# Patient Record
Sex: FEMALE | Race: WHITE | NOT HISPANIC OR LATINO | URBAN - METROPOLITAN AREA
[De-identification: names, ages, dates, MRNs, and addresses within clinical notes are randomized per-mention and may not be internally consistent; named-entity substitution may affect disease eponyms.]

---

## 2017-01-16 ENCOUNTER — OFFICE VISIT - RIVER FALLS (OUTPATIENT)
Dept: FAMILY MEDICINE | Facility: CLINIC | Age: 47
End: 2017-01-16

## 2017-01-16 ASSESSMENT — MIFFLIN-ST. JEOR: SCORE: 1364.88

## 2017-04-13 ENCOUNTER — OFFICE VISIT - RIVER FALLS (OUTPATIENT)
Dept: FAMILY MEDICINE | Facility: CLINIC | Age: 47
End: 2017-04-13

## 2017-05-30 ENCOUNTER — OFFICE VISIT - RIVER FALLS (OUTPATIENT)
Dept: FAMILY MEDICINE | Facility: CLINIC | Age: 47
End: 2017-05-30

## 2017-05-30 ENCOUNTER — COMMUNICATION - RIVER FALLS (OUTPATIENT)
Dept: FAMILY MEDICINE | Facility: CLINIC | Age: 47
End: 2017-05-30

## 2017-05-30 ASSESSMENT — MIFFLIN-ST. JEOR: SCORE: 1348.55

## 2017-07-13 ENCOUNTER — OFFICE VISIT - RIVER FALLS (OUTPATIENT)
Dept: FAMILY MEDICINE | Facility: CLINIC | Age: 47
End: 2017-07-13

## 2017-07-13 ASSESSMENT — MIFFLIN-ST. JEOR: SCORE: 1322.25

## 2017-08-14 ENCOUNTER — OFFICE VISIT - RIVER FALLS (OUTPATIENT)
Dept: FAMILY MEDICINE | Facility: CLINIC | Age: 47
End: 2017-08-14

## 2017-08-14 ASSESSMENT — MIFFLIN-ST. JEOR: SCORE: 1322.25

## 2017-10-12 ENCOUNTER — OFFICE VISIT - RIVER FALLS (OUTPATIENT)
Dept: FAMILY MEDICINE | Facility: CLINIC | Age: 47
End: 2017-10-12

## 2017-10-12 ASSESSMENT — MIFFLIN-ST. JEOR: SCORE: 1295.03

## 2017-10-31 ENCOUNTER — OFFICE VISIT - RIVER FALLS (OUTPATIENT)
Dept: FAMILY MEDICINE | Facility: CLINIC | Age: 47
End: 2017-10-31

## 2017-10-31 ASSESSMENT — MIFFLIN-ST. JEOR: SCORE: 1306.82

## 2018-01-11 ENCOUNTER — OFFICE VISIT - RIVER FALLS (OUTPATIENT)
Dept: FAMILY MEDICINE | Facility: CLINIC | Age: 48
End: 2018-01-11

## 2018-01-11 ASSESSMENT — MIFFLIN-ST. JEOR: SCORE: 1298.66

## 2018-05-10 ENCOUNTER — OFFICE VISIT - RIVER FALLS (OUTPATIENT)
Dept: FAMILY MEDICINE | Facility: CLINIC | Age: 48
End: 2018-05-10

## 2018-05-10 ASSESSMENT — MIFFLIN-ST. JEOR: SCORE: 1302.29

## 2018-08-15 ENCOUNTER — OFFICE VISIT - RIVER FALLS (OUTPATIENT)
Dept: FAMILY MEDICINE | Facility: CLINIC | Age: 48
End: 2018-08-15

## 2018-08-15 ASSESSMENT — MIFFLIN-ST. JEOR: SCORE: 1305.92

## 2018-11-08 ENCOUNTER — OFFICE VISIT - RIVER FALLS (OUTPATIENT)
Dept: FAMILY MEDICINE | Facility: CLINIC | Age: 48
End: 2018-11-08

## 2018-11-08 ASSESSMENT — MIFFLIN-ST. JEOR: SCORE: 1307.73

## 2019-02-11 ENCOUNTER — OFFICE VISIT - RIVER FALLS (OUTPATIENT)
Dept: FAMILY MEDICINE | Facility: CLINIC | Age: 49
End: 2019-02-11

## 2019-02-11 ASSESSMENT — MIFFLIN-ST. JEOR: SCORE: 1305.92

## 2019-05-09 ENCOUNTER — OFFICE VISIT - RIVER FALLS (OUTPATIENT)
Dept: FAMILY MEDICINE | Facility: CLINIC | Age: 49
End: 2019-05-09

## 2019-05-09 ASSESSMENT — MIFFLIN-ST. JEOR: SCORE: 1334.04

## 2019-08-13 ENCOUNTER — OFFICE VISIT - RIVER FALLS (OUTPATIENT)
Dept: FAMILY MEDICINE | Facility: CLINIC | Age: 49
End: 2019-08-13

## 2019-08-13 ASSESSMENT — MIFFLIN-ST. JEOR: SCORE: 1310.45

## 2022-02-11 VITALS
HEIGHT: 68 IN | DIASTOLIC BLOOD PRESSURE: 72 MMHG | WEIGHT: 154.4 LBS | BODY MASS INDEX: 23.4 KG/M2 | SYSTOLIC BLOOD PRESSURE: 128 MMHG | HEART RATE: 76 BPM

## 2022-02-11 VITALS
DIASTOLIC BLOOD PRESSURE: 78 MMHG | WEIGHT: 147.6 LBS | BODY MASS INDEX: 22.37 KG/M2 | TEMPERATURE: 98.8 F | HEIGHT: 68 IN | HEART RATE: 64 BPM | SYSTOLIC BLOOD PRESSURE: 124 MMHG

## 2022-02-12 VITALS
SYSTOLIC BLOOD PRESSURE: 120 MMHG | BODY MASS INDEX: 21.98 KG/M2 | TEMPERATURE: 97.1 F | HEIGHT: 68 IN | WEIGHT: 145 LBS | WEIGHT: 145 LBS | DIASTOLIC BLOOD PRESSURE: 62 MMHG | HEART RATE: 72 BPM | HEIGHT: 68 IN | BODY MASS INDEX: 21.98 KG/M2

## 2022-02-12 VITALS
BODY MASS INDEX: 21.07 KG/M2 | DIASTOLIC BLOOD PRESSURE: 66 MMHG | TEMPERATURE: 97.9 F | HEIGHT: 68 IN | HEIGHT: 68 IN | WEIGHT: 139 LBS | WEIGHT: 141.6 LBS | SYSTOLIC BLOOD PRESSURE: 110 MMHG | BODY MASS INDEX: 21.46 KG/M2 | TEMPERATURE: 97.4 F | OXYGEN SATURATION: 99 % | HEART RATE: 72 BPM

## 2022-02-12 VITALS
WEIGHT: 141.4 LBS | HEART RATE: 91 BPM | HEIGHT: 68 IN | SYSTOLIC BLOOD PRESSURE: 120 MMHG | DIASTOLIC BLOOD PRESSURE: 68 MMHG | BODY MASS INDEX: 21.43 KG/M2 | OXYGEN SATURATION: 99 %

## 2022-02-12 VITALS
OXYGEN SATURATION: 99 % | BODY MASS INDEX: 21.58 KG/M2 | HEART RATE: 67 BPM | SYSTOLIC BLOOD PRESSURE: 108 MMHG | WEIGHT: 142.4 LBS | HEIGHT: 68 IN | DIASTOLIC BLOOD PRESSURE: 70 MMHG

## 2022-02-12 VITALS
WEIGHT: 140.6 LBS | SYSTOLIC BLOOD PRESSURE: 108 MMHG | OXYGEN SATURATION: 99 % | HEART RATE: 98 BPM | BODY MASS INDEX: 21.31 KG/M2 | DIASTOLIC BLOOD PRESSURE: 74 MMHG | HEIGHT: 68 IN

## 2022-02-12 VITALS
OXYGEN SATURATION: 98 % | SYSTOLIC BLOOD PRESSURE: 122 MMHG | WEIGHT: 141.8 LBS | DIASTOLIC BLOOD PRESSURE: 76 MMHG | BODY MASS INDEX: 21.49 KG/M2 | HEART RATE: 95 BPM | HEIGHT: 68 IN

## 2022-02-12 VITALS
SYSTOLIC BLOOD PRESSURE: 118 MMHG | BODY MASS INDEX: 21.43 KG/M2 | WEIGHT: 141.4 LBS | HEIGHT: 68 IN | DIASTOLIC BLOOD PRESSURE: 76 MMHG | HEART RATE: 88 BPM | TEMPERATURE: 97.4 F

## 2022-02-12 VITALS
HEART RATE: 72 BPM | SYSTOLIC BLOOD PRESSURE: 122 MMHG | HEART RATE: 60 BPM | SYSTOLIC BLOOD PRESSURE: 110 MMHG | DIASTOLIC BLOOD PRESSURE: 70 MMHG | TEMPERATURE: 97.6 F | BODY MASS INDEX: 22.85 KG/M2 | WEIGHT: 150.2 LBS | BODY MASS INDEX: 23.01 KG/M2 | WEIGHT: 150.8 LBS | DIASTOLIC BLOOD PRESSURE: 66 MMHG | HEIGHT: 68 IN

## 2022-02-12 VITALS
BODY MASS INDEX: 21.19 KG/M2 | HEIGHT: 68 IN | HEART RATE: 92 BPM | DIASTOLIC BLOOD PRESSURE: 68 MMHG | WEIGHT: 139.8 LBS | OXYGEN SATURATION: 93 % | SYSTOLIC BLOOD PRESSURE: 102 MMHG

## 2022-02-16 NOTE — PROGRESS NOTES
Patient:   SHARON DOMINGUEZ            MRN: [id number]            FIN: [id number]               Age:   48 years     Sex:  Female     :  1970   Associated Diagnoses:   ADD (Attention Deficit Disorder)   Author:   Douglas Geller MD      Impression and Plan   Diagnosis     ADD (Attention Deficit Disorder) (MIE96-VA F90.0).     Course:  Progressing as expected.    Orders     Orders   Charges (Evaluation and Management):  96279 office outpatient visit 15 minutes (Charge) (Order): Quantity: 1, ADD (Attention Deficit Disorder).     Orders (Selected)   Prescriptions  Prescribed  Adderall 12.5 mg oral tablet: = 2 tab(s) ( 25 mg ), PO, BID, # 120 tab(s), 0 Refill(s), Type: Maintenance.     three prescriptions written.        Visit Information      Date of Service: 2019 08:40 am  Performing Location: [facility]  Encounter#: [id number]      Primary Care Provider (PCP):  Douglas Geller MD    NPI# [id number]      Referring Provider:  Douglas Geller MD, NPI# [id number]   Visit type:  General concerns, Scheduled follow-up.    Accompanied by:  No one.    Source of history:  Self.    Referral source:  Self.    History limitation:  None.       Chief Complaint   2019 8:46 AM CDT    Pt here for med ck        History of Present Illness             The patient presents with Here for ADD med refills.  Patient feels dose of Adderall is appropriate.  Medication continues to work well with no side effects.  Patient wants to continue same treatment plan..        Review of Systems   Constitutional:  Negative.    Eye:  Negative.    Ear/Nose/Mouth/Throat:  Negative.    Respiratory:  Negative.    Cardiovascular:  Negative.    Gastrointestinal:  Negative.    Genitourinary:  Negative.    Hematology/Lymphatics:  Negative.    Endocrine:  Negative.    Immunologic:  Negative.    Musculoskeletal:  Negative.    Integumentary:  Negative.    Neurologic:  Negative.    Psychiatric:  Negative.           All other systems  reviewed and negative      Health Status   Allergies:    Allergic Reactions (Selected)  Severity Not Documented  Pineapple (Throat/tongue swelling, tingling)   Medications:  (Selected)   Prescriptions  Prescribed  Adderall 12.5 mg oral tablet: = 2 tab(s) ( 25 mg ), PO, BID, # 120 tab(s), 0 Refill(s), Type: Maintenance  Documented Medications  Documented  Melatonin: hs, 0 Refill(s), Type: Maintenance  Vitamin D3 400 intl units oral capsule: = 1 cap(s) ( 400 International Unit ), Oral, daily, 0 Refill(s), Type: Maintenance  biotin: Oral, daily, 0 Refill(s), Type: Maintenance,    Medications          *denotes recorded medication          Adderall 12.5 mg oral tablet: 25 mg, 2 tab(s), PO, BID, 120 tab(s), 0 Refill(s).          *biotin: Oral, daily, 0 Refill(s).          *Vitamin D3 400 intl units oral capsule: 400 International Unit, 1 cap(s), Oral, daily, 0 Refill(s).          *Melatonin: hs, 0 Refill(s).       Problem list:    All Problems  ADD (Attention Deficit Disorder) / SNOMED CT 06895586 / Confirmed      Histories   Past Medical History:    No active or resolved past medical history items have been selected or recorded.   Family History:    Hypertension  Mother     Procedure history:    Dilation and curettage (SNOMED CT 07703016) in the month of 11/2007 at 36 Years.  cervical conization in the month of 9/2005 at 34 Years.  Tubal ligation (SNOMED CT 783223234) in the month of 9/2002 at 31 Years.  Comments:  5/11/2010 3:23 PM CDT - Sheryl Tapia  rt ooophorectomy due to benign teratoma  Tonsillectomy (SNOMED CT 452168646) in 1993 at 23 Years.  Hysterectomy (SNOMED CT 427112554).  Comments:  5/11/2010 3:24 PM CDT - Sheryl Tapia  supracervical due to fibroids   Social History:        Alcohol Assessment: Denies Alcohol Use      Tobacco Assessment: Denies Tobacco Use  ,        Alcohol Assessment: Denies Alcohol Use      Tobacco Assessment: Denies Tobacco Use        Physical Examination   Vital Signs   8/13/2019 8:46  AM CDT Peripheral Pulse Rate 67 bpm    Systolic Blood Pressure 108 mmHg    Diastolic Blood Pressure 70 mmHg    Mean Arterial Pressure 83 mmHg    Oxygen Saturation 99 %      Measurements from flowsheet : Measurements   8/13/2019 8:46 AM CDT Height Measured - Standard 67.75 in    Weight Measured - Standard 142.4 lb    BSA 1.76 m2    Body Mass Index 21.81 kg/m2      General:  Alert and oriented, No acute distress.    Eye:  Normal conjunctiva.    HENT:  Normocephalic.    Neck:  Supple, Non-tender.    Respiratory:  Lungs are clear to auscultation, Respirations are non-labored, Breath sounds are equal.    Cardiovascular:  Normal rate, Regular rhythm.    Integumentary:  Warm, Dry, Pink, No rash.    Neurologic:  Alert, Oriented, Normal motor function, No focal deficits.    Psychiatric:  Cooperative, Appropriate mood & affect, Normal judgment.       Review / Management   Results review

## 2022-02-16 NOTE — PROGRESS NOTES
Patient:   SHARON DOMINGUEZ            MRN: [id number]            FIN: [id number]               Age:   46 years     Sex:  Female     :  1970   Associated Diagnoses:   Changing skin lesion   Author:   Douglas Geller MD      Impression and Plan   Diagnosis     Changing skin lesion (CMR77-AA L98.9).     Orders      (Selected)   Outpatient Orders  Ordered (Dispatched)  Tissue pathology* ([facility]): Specimen Type: Miscellaneous Specimen, Collection Date: 17 9:53:00 CDT  Order  [id number] shaving skin lesion 1 s/n/h/f/g diam 0.6-1.0 cm (Charge): Quantity: 1, Changing skin lesion.     Counseled:  Patient, Regarding diagnosis, Regarding treatment.       Visit Information      Date of Service: 2017 09:15 am  Performing Location: [facility]  Encounter#: [id number]      Primary Care Provider (PCP):  Douglas Geller MD    NPI# [id number]   Visit type:  New symptom.    Accompanied by:  No one.    Source of history:  Self.    History limitation:  None.       Procedure   Biopsy procedure   Date/ Time:  2017 9:56:00 AM.     Confirmed: patient, procedure, side, site, safety procedures followed.     Performed by: Douglas Geller MD.     Informed consent: signed by patient.     Indication: abnormal physical findings, suspected malignancy.     Preparation and technique: skin prepped (in usual sterile fashion, with alcohol), sterile preparation of site, local anesthesia 1% lidocaine without epinephrine 1.0  ml, technique (shave biopsy, number 10 scalpel), hemostasis achieved using electrocautery.     Site #  1: right, face, size and depth (length  1.0  cm, width  0.5  cm, superficial), specimen sent to pathology in preservative.     Completion: 1  lesions biopsied, estimated blood loss minimal, dressing applied (adhesive strip, with antibiotic ointment).     Procedure tolerated: well.     wound care instructions given.

## 2022-02-16 NOTE — TELEPHONE ENCOUNTER
---------------------  From: Radha Oliver CMA   To: Douglas Geller MD;     Sent: 2019 4:18:21 PM CDT  Subject: refill request- adderall     PCP:   JOSE    Medication:   adderall  Last Filled:  19 by [facility] x 2 rx's pt to call for the 3rd   Quantity:  _  Refills:  _      Date of last office visit and reason:   19      Note:  Pt called requesting refill    Pharmacy: [facility]    Resource:   [facility]  Phone:   [phone]  ** Submitted: **  Order:amphetamine-dextroamphetamine (Adderall 12.5 mg oral tablet)  2 tab(s)  PO  BID  Qty:  120 tab(s)        Refills:  0          Substitutions Allowed     Route To Pharmacy - [facility]    Signed by Douglas Geller MD  4/10/2019 12:13:00 PM

## 2022-02-16 NOTE — PROGRESS NOTES
Patient:   SHARON DOMINGUEZ            MRN: [id number]            FIN: [id number]               Age:   46 years     Sex:  Female     :  1970   Associated Diagnoses:   Adult ADHD   Author:   Douglas Mathews PA-C      Chief Complaint   10/12/2017 11:09 AM CDT  Pt here for ADHD med ck        History of Present Illness             The patient presents with Presents for follow up of attention deficit disorder.  There have been no problems with the medication.  There are no other current concerns..                Review of Systems   Constitutional:  Negative.    Cardiovascular:  Negative.    Gastrointestinal:  Negative.    Musculoskeletal:  Negative.    Neurologic:  Negative.    Psychiatric:  PHQ-9=  0, No anxiety, No depression.       Health Status   Allergies:    Allergic Reactions (Selected)  Severity Not Documented  Pineapple (Throat/tongue swelling, tingling)   Problem list:    All Problems  ADD (Attention Deficit Disorder) / SNOMED CT 12157939 / Confirmed   Medications:  (Selected)   Prescriptions  Prescribed  Adderall XR 25 mg oral capsule, extended release: 2 cap(s) ( 50 mg ), PO, qAM, # 60 cap(s), 0 Refill(s), Type: Maintenance, Pharmacy: [facility], 2 cap(s) po qam  Documented Medications  Documented  Fish Oil oral capsule: po, daily, 0 Refill(s), Type: Maintenance  Iron Chews: See Instructions, Instructions: 18mg po every other day, 0 Refill(s), Type: Maintenance  Melatonin: hs, 0 Refill(s), Type: Maintenance  Vitamin D3 2000 intl units oral tablet: 1 tab(s) ( 2,000 International Unit ), po, daily, 0 Refill(s), Type: Maintenance  barkberry: barkberry, See Instructions, Supply, 0 Refill(s), Type: Maintenance  coconut oil vitamin: coconut oil vitamin, Supply, 0 Refill(s), Type: Maintenance  multivitamin with minerals (w/ Iron): 0 Refill(s)      Histories   Past Medical History:    No active or resolved past medical history items have been selected or recorded.   Family History:     Hypertension  Mother     Procedure history:    Dilation and curettage (64360646) in the month of 11/2007 at 36 Years.  cervical conization in the month of 9/2005 at 34 Years.  Tubal ligation (258249086) in the month of 9/2002 at 31 Years.  Comments:  5/11/2010 3:23 PM - Sheryl Tapia  rt ooophorectomy due to benign teratoma  Tonsillectomy (214087122) in 1993 at 23 Years.  Hysterectomy (279814537).  Comments:  5/11/2010 3:24 PM - Sheryl Tapia  supracervical due to fibroids   Social History:        Alcohol Assessment: Denies Alcohol Use      Tobacco Assessment: Denies Tobacco Use        Physical Examination   Vital Signs   10/12/2017 11:09 AM CDT Temperature Tympanic 97.4 DegF  LOW    Peripheral Pulse Rate 72 bpm    Pulse Site Radial artery    Systolic Blood Pressure 110 mmHg    Diastolic Blood Pressure 66 mmHg    Mean Arterial Pressure 81 mmHg    BP Site Left arm    Oxygen Saturation 99 %      Measurements from flowsheet : Measurements   10/12/2017 11:09 AM CDT Height Measured - Standard 67.75 in    Weight Measured - Standard 139 lb    BSA 1.73 m2    Body Mass Index 21.29 kg/m2      General:  No acute distress.    HENT:  Tympanic membranes are clear, No pharyngeal erythema, No sinus tenderness.    Neck:  Supple, Non-tender, No lymphadenopathy.    Respiratory:  Lungs are clear to auscultation.    Cardiovascular:  Normal rate, Regular rhythm, No murmur.    Psychiatric:  Cooperative, Appropriate mood & affect.       Impression and Plan   Diagnosis     Adult ADHD (VWX59-TI F90.0).     Course:  Progressing as expected, Well controlled.    Plan:  continue current medication, follow up in 90 days.    Orders     Orders   Pharmacy:  Adderall XR 25 mg oral capsule, extended release (Prescribe): 2 cap(s) ( 50 mg ), PO, qAM, Instructions: fill on or after 10/12/2017, # 60 cap(s), 0 Refill(s), Type: Maintenance, Pharmacy: [facility], 2 cap(s) po qam,Instr:fill on or after 10/12/2017  Adderall XR 25 mg oral capsule,  extended release (Modify): 2 cap(s) ( 50 mg ), PO, qAM, # 60 cap(s), 0 Refill(s), Type: Hard Stop, Pharmacy: [facility].     Orders   Charges (Evaluation and Management):  26879 office outpatient visit 15 minutes (Charge) (Order): Quantity: 1, Adult ADHD.     Summary

## 2022-02-16 NOTE — PROGRESS NOTES
Patient:   SHARON DOMINGUEZ            MRN: [id number]            FIN: [id number]               Age:   46 years     Sex:  Female     :  1970   Associated Diagnoses:   None   Author:   Douglas Geller MD      The [facility] website record of scheduled medication dispensations for this patient was reviewed prior to providing a new prescription.

## 2022-02-16 NOTE — NURSING NOTE
Comprehensive Intake Entered On:  8/13/2019 8:50 AM CDT    Performed On:  8/13/2019 8:46 AM CDT by Radha Oliver CMA               Summary   Chief Complaint :   Pt here for med ck   Weight Measured :   142.4 lb(Converted to: 142 lb 6 oz, 64.59 kg)    Height Measured :   67.75 in(Converted to: 5 ft 8 in, 172.08 cm)    Body Mass Index :   21.81 kg/m2   Body Surface Area :   1.76 m2   Systolic Blood Pressure :   108 mmHg   Diastolic Blood Pressure :   70 mmHg   Mean Arterial Pressure :   83 mmHg   Peripheral Pulse Rate :   67 bpm   Oxygen Saturation :   99 %   Radha Oliver CMA - 8/13/2019 8:46 AM CDT   Health Status   Allergies Verified? :   Yes   Medication History Verified? :   Yes   Medical History Verified? :   Yes   Pre-Visit Planning Status :   Completed   Tobacco Use? :   Never smoker   Radha Oliver CMA - 8/13/2019 8:46 AM CDT   Consents   Consent for Immunization Exchange :   Consent Granted   Consent for Immunizations to Providers :   Consent Granted   Radha Oliver CMA - 8/13/2019 8:46 AM CDT   Meds / Allergies   (As Of: 8/13/2019 8:50:15 AM CDT)   Allergies (Active)   Pineapple  Estimated Onset Date:   Unspecified ; Reactions:   throat/tongue swelling, tingling ; Created By:   Julienne Gallardo CMA; Reaction Status:   Active ; Category:   Food ; Substance:   Pineapple ; Type:   Allergy ; Updated By:   Julienne Gallardo CMA; Source:   Patient ; Reviewed Date:   8/13/2019 8:47 AM CDT        Medication List   (As Of: 8/13/2019 8:50:15 AM CDT)   Prescription/Discharge Order    amphetamine-dextroamphetamine  :   amphetamine-dextroamphetamine ; Status:   Prescribed ; Ordered As Mnemonic:   Adderall 12.5 mg oral tablet ; Simple Display Line:   25 mg, 2 tab(s), PO, BID, 120 tab(s), 0 Refill(s) ; Ordering Provider:   Douglas Geller MD; Catalog Code:   amphetamine-dextroamphetamine ; Order Dt/Tm:   5/9/2019 4:52:04 PM            Home Meds    biotin  :   biotin ; Status:   Documented ; Ordered As  Mnemonic:   biotin ; Simple Display Line:   Oral, daily, 0 Refill(s) ; Catalog Code:   biotin ; Order Dt/Tm:   2/11/2019 9:04:33 AM          cholecalciferol  :   cholecalciferol ; Status:   Documented ; Ordered As Mnemonic:   Vitamin D3 400 intl units oral capsule ; Simple Display Line:   400 International Unit, 1 cap(s), Oral, daily, 0 Refill(s) ; Catalog Code:   cholecalciferol ; Order Dt/Tm:   5/9/2019 4:35:01 PM          melatonin  :   melatonin ; Status:   Documented ; Ordered As Mnemonic:   Melatonin ; Simple Display Line:   hs, 0 Refill(s) ; Catalog Code:   melatonin ; Order Dt/Tm:   4/13/2017 8:58:26 AM

## 2022-02-16 NOTE — PROGRESS NOTES
Patient:   SHARON DOMINGUEZ            MRN: [id number]            FIN: [id number]               Age:   48 years     Sex:  Female     :  1970   Associated Diagnoses:   ADD (attention deficit disorder) without hyperactivity   Author:   Aranza Herrera      Visit Information      Date of Service: 2019 08:51 am  Performing Location: [facility]  Encounter#: [id number]      Primary Care Provider (PCP):  Douglas Geller MD    NPI# [id number]      Referring Provider:  Aranza Herrera    NPI# [id number]   Visit type:  General concerns.       Chief Complaint   2019 8:59 AM CST    Pt here for med check/refill- Adderall        History of Present Illness             The patient presents with here for ADD/ADHD med refill.  Denies sleep problems or tics  No racing heart  Weight loss is not a concern  Able to focus on work    her CSA and urine drug tests are UTD.        Review of Systems             Health Status   Allergies:    Allergic Reactions (Selected)  Severity Not Documented  Pineapple (Throat/tongue swelling, tingling)   Medications:  (Selected)   Prescriptions  Prescribed  Adderall 12.5 mg oral tablet: = 2 tab(s) ( 25 mg ), PO, BID, # 120 tab(s), 0 Refill(s), Type: Maintenance, Pharmacy: [facility], 2 tab(s) Oral bid  Adderall 12.5 mg oral tablet: = 2 tab(s) ( 25 mg ), PO, BID, Instructions: fill on or after 2018, # 120 tab(s), 0 Refill(s), Type: Maintenance, Pharmacy: [facility], 2 tab(s) Oral bid,Instr:fill on or after 2018  Adderall 12.5 mg oral tablet: = 2 tab(s) ( 25 mg ), PO, BID, Instructions: ok to fill in 30 days, # 120 tab(s), 0 Refill(s), Type: Maintenance, Pharmacy: [facility], 2 tab(s) Oral bid,Instr:ok to fill in 30 days  Documented Medications  Documented  Melatonin: hs, 0 Refill(s), Type: Maintenance  biotin: Oral, daily, 0 Refill(s), Type: Maintenance   Problem list:    All Problems  ADD (Attention Deficit Disorder) / SNOMED CT  75244538 / Confirmed      Histories   Past Medical History:    No active or resolved past medical history items have been selected or recorded.   Family History:    Hypertension  Mother     Procedure history:    Dilation and curettage (SNOMED CT 70101942) in the month of 11/2007 at 36 Years.  cervical conization in the month of 9/2005 at 34 Years.  Tubal ligation (SNOMED CT 052906229) in the month of 9/2002 at 31 Years.  Comments:  5/11/2010 3:23 PM CDT - Sheryl Tapia  rt ooophorectomy due to benign teratoma  Tonsillectomy (SNOMED CT 423954049) in 1993 at 23 Years.  Hysterectomy (SNOMED CT 941349834).  Comments:  5/11/2010 3:24 PM CDT - Sheryl Tapia  supracervical due to fibroids   Social History:        Alcohol Assessment: Denies Alcohol Use      Tobacco Assessment: Denies Tobacco Use,        Alcohol Assessment: Denies Alcohol Use      Tobacco Assessment: Denies Tobacco Use      Physical Examination   Vital Signs   2/11/2019 8:59 AM CST Temperature Tympanic 97.4 DegF  LOW    Peripheral Pulse Rate 88 bpm    Pulse Site Radial artery    HR Method Manual    Systolic Blood Pressure 118 mmHg    Diastolic Blood Pressure 76 mmHg    Mean Arterial Pressure 90 mmHg    BP Site Right arm    BP Method Manual      Measurements from flowsheet : Measurements   2/11/2019 8:59 AM CST Height Measured - Standard 67.75 in    Weight Measured - Standard 141.4 lb    BSA 1.75 m2    Body Mass Index 21.66 kg/m2      General:  Alert and oriented, No acute distress.    Eye:  Normal conjunctiva.    HENT:  Normocephalic.    Neck:  Supple, Non-tender.    Respiratory:  Lungs are clear to auscultation, Respirations are non-labored, Breath sounds are equal.    Cardiovascular:  Normal rate, Regular rhythm.    Integumentary:  Warm, Dry, Pink, No rash.    Neurologic:  Alert, Oriented, Normal motor function, No focal deficits.    Psychiatric:  Cooperative, Appropriate mood & affect, Normal judgment.       Review / Management   Results review       Impression and Plan   Diagnosis     ADD (attention deficit disorder) without hyperactivity (GRQ08-NI F90.0).     Course:  Progressing as expected.    Patient Instructions:       Counseled: Patient, Regarding diagnosis, Regarding treatment, Regarding medications, Verbalized understanding.    Orders     15 min wtih pt, over 10 min in counseling  [place] attempted [place] check [place] and she was not listed on [place] or [place] database  I called her pharmacy  and pharmacisit Asim confirms that  she picked up her last RX 30 days ago  Rx sent for 2 months, okay to call for 3rd.

## 2022-02-16 NOTE — PROGRESS NOTES
Patient:   SHARON DOMINGUEZ            MRN: [id number]            FIN: [id number]               Age:   47 years     Sex:  Female     :  1970   Associated Diagnoses:   Adult ADHD   Author:   Douglas Mathews PA-C      Chief Complaint   5/10/2018 4:40 PM CDT    Pt here for med ck      History of Present Illness             The patient presents with Presents for follow up of attention deficit disorder.  There have been no problems with the medication.  There are no other current concerns..       she takes melatonin 5 mg qhs prn insomnia and that is helpful         Review of Systems   Constitutional:  Negative.    Cardiovascular:  Negative.    Gastrointestinal:  Negative.    Musculoskeletal:  Negative.    Neurologic:  Negative.    Psychiatric:  No anxiety, No depression.       Health Status   Allergies:    Allergic Reactions (Selected)  Severity Not Documented  Pineapple (Throat/tongue swelling, tingling)   Problem list:    All Problems  ADD (Attention Deficit Disorder) / SNOMED CT 36997520 / Confirmed   Medications:  (Selected)   Prescriptions  Prescribed  Adderall XR 25 mg oral capsule, extended release: 2 cap(s) ( 50 mg ), PO, qAM, Instructions: fill on or after 2018, # 60 cap(s), 0 Refill(s), Type: Maintenance, Pharmacy: [facility], 2 cap(s) po qam,Instr:fill on or after 2018  Documented Medications  Documented  Fish Oil oral capsule: po, daily, 0 Refill(s), Type: Maintenance  Iron Chews: See Instructions, Instructions: 18mg po every other day, 0 Refill(s), Type: Maintenance  Melatonin: hs, 0 Refill(s), Type: Maintenance  Vitamin D3 2000 intl units oral tablet: 1 tab(s) ( 2,000 International Unit ), po, daily, 0 Refill(s), Type: Maintenance  barkberry: barkberry, See Instructions, Supply, 0 Refill(s), Type: Maintenance  coconut oil vitamin: coconut oil vitamin, Supply, 0 Refill(s), Type: Maintenance  multivitamin with minerals (w/ Iron): 0 Refill(s)      Histories   Past Medical History:     No active or resolved past medical history items have been selected or recorded.   Family History:    Hypertension  Mother     Procedure history:    Dilation and curettage (30107073) in the month of 11/2007 at 36 Years.  cervical conization in the month of 9/2005 at 34 Years.  Tubal ligation (901926293) in the month of 9/2002 at 31 Years.  Comments:  5/11/2010 3:23 PM - Tapia , Sheryl  rt ooophorectomy due to benign teratoma  Tonsillectomy (710575489) in 1993 at 23 Years.  Hysterectomy (242449145).  Comments:  5/11/2010 3:24 PM - Tapia , Sheryl  supracervical due to fibroids   Social History:        Alcohol Assessment: Denies Alcohol Use      Tobacco Assessment: Denies Tobacco Use        Physical Examination   Vital Signs   5/10/2018 4:40 PM CDT Peripheral Pulse Rate 98 bpm    Systolic Blood Pressure 108 mmHg    Diastolic Blood Pressure 74 mmHg    Mean Arterial Pressure 85 mmHg    BP Site Left upper leg    Oxygen Saturation 99 %      Measurements from flowsheet : Measurements   5/10/2018 4:40 PM CDT Height Measured - Standard 67.75 in    Weight Measured - Standard 140.6 lb    BSA 1.74 m2    Body Mass Index 21.53 kg/m2      General:  No acute distress.    HENT:  Tympanic membranes are clear, No pharyngeal erythema, No sinus tenderness.    Neck:  Supple, Non-tender, No lymphadenopathy.    Respiratory:  Lungs are clear to auscultation.    Cardiovascular:  Normal rate, Regular rhythm, No murmur.    Psychiatric:  Cooperative, Appropriate mood & affect.       Impression and Plan   Diagnosis     Adult ADHD (ZPA72-DZ F90.0).     Course:  Progressing as expected, Well controlled.    Plan:  continue current medication, given Rx for 2 months, will call for the 3rd  month, will be seen every 90 days.    Orders     Orders   Pharmacy:  Adderall XR 25 mg oral capsule, extended release (Prescribe): 2 cap(s) ( 50 mg ), PO, qAM, Instructions: fill on or after 6/08/2018, # 60 cap(s), 0 Refill(s), Type: Maintenance, Pharmacy: [facility], 2 cap(s) po qam,Instr:fill on or after 6/08/2018  Adderall XR 25 mg oral capsule, extended release (Prescribe): 2 cap(s) ( 50 mg ), PO, qAM, Instructions: fill on or after 5/10/2018, # 60 cap(s), 0 Refill(s), Type: Maintenance, Pharmacy: [facility], 2 cap(s) po qam,Instr:fill on or after 5/10/2018  Adderall XR 25 mg oral capsule, extended release (Modify): 2 cap(s) ( 50 mg ), PO, qAM, Instructions: fill on or after 4/12/2018, # 60 cap(s), 0 Refill(s), Type: Hard Stop, Pharmacy: [facility].     Orders   Charges (Evaluation and Management):  30100 office outpatient visit 15 minutes (Charge) (Order): Quantity: 1, Adult ADHD.     Summary

## 2022-02-16 NOTE — PROGRESS NOTES
Patient:   SHARON DOMINGUEZ            MRN: [id number]            FIN: [id number]               Age:   46 years     Sex:  Female     :  1970   Associated Diagnoses:   ADD (Attention Deficit Disorder)   Author:   Douglas Geller MD      Impression and Plan   Diagnosis     ADD (Attention Deficit Disorder) ([id number] F90.0).     Course:  Progressing as expected.    Orders     Orders   Charges (Evaluation and Management):  80028 office outpatient visit 15 minutes (Charge) (Order): Quantity: 1, ADD (Attention Deficit Disorder).     Orders (Selected)   Outpatient Orders  Ordered  CBC, Platelet; no differential (Request): Lipid screening  Encounter for screening examination for impaired glucose regulation and diabetes mellitus  Comprehensive Metabolic Panel (Request): Lipid screening  Encounter for screening examination for impaired glucose regulation and diabetes mellitus  Lipid Panel (Request): Lipid screening  Encounter for screening examination for impaired glucose regulation and diabetes mellitus  Prescriptions  Prescribed  Adderall XR 25 mg oral capsule, extended release: 2 cap(s) ( 50 mg ), PO, qAM, # 60 cap(s), 0 Refill(s), Type: Maintenance, Pharmacy: [facility], 2 cap(s) po qam.        Visit Information      Date of Service: 2017 08:42 am  Performing Location: [facility]  Encounter#: [id number]      Primary Care Provider (PCP):  Douglas Geller MD    NPI# [id number]      Referring Provider:  No referring provider recorded for selected visit.   Visit type:  Scheduled follow-up.    Accompanied by:  No one.    Source of history:  Self.    Referral source:  Self.    History limitation:  None.       Chief Complaint   Chief complaint discussed and confirmed correct.     2017 8:57 AM CDT    Pt here for med ck        History of Present Illness             The patient presents Attention Deficit Discorder.  Exacerbating factors consist of need to concentrate at work place.   Relieving factors consist of medication.  Associated symptoms consist of none.  Additional pertinent history: none.     Medication working very well with no side effects - patient wants to continue treatment.      Review of Systems   Constitutional:  Negative.    Eye:  Negative.    Ear/Nose/Mouth/Throat:  Negative.    Respiratory:  Negative.    Cardiovascular:  Negative.    Gastrointestinal:  Negative.    Genitourinary:  Negative.    Hematology/Lymphatics:  Negative.    Endocrine:  Negative.    Immunologic:  Negative.    Musculoskeletal:  soft tissue injury right lateral hip area due to yoga.  offered PT.  Patient will call back if she wishes Rx..    Integumentary:  Negative.    Neurologic:  Negative.    Psychiatric:  Negative.    All other systems reviewed and negative      Health Status   Allergies:    Allergic Reactions (Selected)  Severity Not Documented  Pineapple (Throat/tongue swelling, tingling)   Problem list:    All Problems  ADD (Attention Deficit Disorder) / SNOMED CT 48846120 / Confirmed   Medications:  (Selected)   Prescriptions  Prescribed  Adderall XR 25 mg oral capsule, extended release: 2 cap(s) ( 50 mg ), PO, qAM, # 60 cap(s), 0 Refill(s), Type: Maintenance, Pharmacy: [facility], 2 cap(s) po qam  Documented Medications  Documented  Fish Oil oral capsule: po, daily, 0 Refill(s), Type: Maintenance  Iron Chews: See Instructions, Instructions: 18mg po every other day, 0 Refill(s), Type: Maintenance  Melatonin: hs, 0 Refill(s), Type: Maintenance  Ranjit's wort oral tablet: 0 Refill(s), Type: Maintenance  Vitamin D3 2000 intl units oral tablet: 1 tab(s) ( 2,000 International Unit ), po, daily, 0 Refill(s), Type: Maintenance  barkberry: barkberry, See Instructions, Supply, 0 Refill(s), Type: Maintenance  magnesium oxide: ( 400 mg ), po, daily, 0 Refill(s), Type: Maintenance  multivitamin with minerals (w/ Iron): 0 Refill(s)      Histories   Past Medical History:    No active or resolved past  medical history items have been selected or recorded.   Family History:    Hypertension  Mother     Procedure history:    Dilation and curettage (SNOMED CT 66957875) in the month of 11/2007 at 36 Years.  cervical conization in the month of 9/2005 at 34 Years.  Tubal ligation (SNOMED CT 897087895) in the month of 9/2002 at 31 Years.  Comments:  5/11/2010 3:23 PM - Tapia , Sheryl  rt ooophorectomy due to benign teratoma  Tonsillectomy (SNOMED CT 656104735) in 1993 at 23 Years.  Hysterectomy (SNOMED CT 421334157).  Comments:  5/11/2010 3:24 PM - Tapia , Sheryl  supracervical due to fibroids   Social History:        Alcohol Assessment: Denies Alcohol Use      Tobacco Assessment: Denies Tobacco Use        Physical Examination   Vital Signs   4/13/2017 8:57 AM CDT Peripheral Pulse Rate 60 bpm    Pulse Site Radial artery    Systolic Blood Pressure 110 mmHg    Diastolic Blood Pressure 70 mmHg    Mean Arterial Pressure 83 mmHg    BP Site Right arm      Measurements from flowsheet : Measurements   4/13/2017 8:57 AM CDT    Weight Measured - Standard                150.2 lb     General:  No acute distress.    Neck:  Supple, No lymphadenopathy, No thyromegaly.    Respiratory:  Lungs are clear to auscultation, Respirations are non-labored, Breath sounds are equal, Symmetrical chest wall expansion.    Cardiovascular:  Normal rate, Regular rhythm, No murmur, No gallop, Good pulses equal in all extremities, Normal peripheral perfusion, No edema.    Gastrointestinal:  Soft, Non-tender, Non-distended, Normal bowel sounds, No organomegaly.    Integumentary:  Warm, Dry, Pink.    Neurologic:  Alert, Oriented, No neurologic tics noted on examination.    Psychiatric:  Cooperative, Appropriate mood & affect.

## 2022-02-16 NOTE — PROGRESS NOTES
Patient:   SHARON DOMINGUEZ            MRN: [id number]            FIN: [id number]               Age:   48 years     Sex:  Female     :  1970   Associated Diagnoses:   ADD (Attention Deficit Disorder)   Author:   Douglas Geller MD      Impression and Plan   Diagnosis     ADD (Attention Deficit Disorder) (VSA76-EN F90.0).     Course:  Progressing as expected.    Orders     Orders   Charges (Evaluation and Management):  31477 office outpatient visit 15 minutes (Charge) (Order): Quantity: 1, ADD (Attention Deficit Disorder).     Orders (Selected)   Prescriptions  Prescribed  Adderall 12.5 mg oral tablet: = 2 tab(s) ( 25 mg ), PO, BID, # 120 tab(s), 0 Refill(s), Type: Maintenance.        Visit Information      Date of Service: 2019 04:08 pm  Performing Location: [facility]  Encounter#: [id number]      Primary Care Provider (PCP):  Douglas Geller MD    NPI# [id number]      Referring Provider:  Douglas Geller MD    NPI# [id number]   Visit type:  General concerns.       Chief Complaint      History of Present Illness             The patient presents with here for ADD/ADHD med refill.  Denies sleep problems or tics  No racing heart  Weight loss is not a concern  Able to focus on work    her CSA and urine drug tests are UTD.        Review of Systems   Constitutional:  Negative.    Eye:  Negative.    Ear/Nose/Mouth/Throat:  Negative.    Respiratory:  Negative.    Cardiovascular:  Negative.    Gastrointestinal:  Negative.    Genitourinary:  Negative.    Hematology/Lymphatics:  Negative.    Endocrine:  Negative.    Immunologic:  Negative.    Musculoskeletal:  Negative.    Integumentary:  Negative.    Neurologic:  Negative.    Psychiatric:  Negative.           All other systems reviewed and negative      Health Status   Allergies:    Allergic Reactions (Selected)  Severity Not Documented  Pineapple (Throat/tongue swelling, tingling)   Medications:  (Selected)   Prescriptions  Prescribed  Adderall  12.5 mg oral tablet: = 2 tab(s) ( 25 mg ), PO, BID, # 120 tab(s), 0 Refill(s), Type: Maintenance  Documented Medications  Documented  Melatonin: hs, 0 Refill(s), Type: Maintenance  Vitamin D3 400 intl units oral capsule: = 1 cap(s) ( 400 International Unit ), Oral, daily, 0 Refill(s), Type: Maintenance  biotin: Oral, daily, 0 Refill(s), Type: Maintenance   Problem list:    All Problems  ADD (Attention Deficit Disorder) / SNOMED CT 12206282 / Confirmed      Histories   Past Medical History:    No active or resolved past medical history items have been selected or recorded.   Family History:    Hypertension  Mother     Procedure history:    Dilation and curettage (SNOMED CT 42365322) in the month of 11/2007 at 36 Years.  cervical conization in the month of 9/2005 at 34 Years.  Tubal ligation (SNOMED CT 559606572) in the month of 9/2002 at 31 Years.  Comments:  5/11/2010 3:23 PM CDT - Sheryl Tapia  rt ooophorectomy due to benign teratoma  Tonsillectomy (SNOMED CT 360184765) in 1993 at 23 Years.  Hysterectomy (SNOMED CT 583892282).  Comments:  5/11/2010 3:24 PM [facility] - Sheryl Tapia  supracervical due to fibroids   Social History:        Alcohol Assessment: Denies Alcohol Use      Tobacco Assessment: Denies Tobacco Use,        Alcohol Assessment: Denies Alcohol Use      Tobacco Assessment: Denies Tobacco Use      Physical Examination   VS/Measurements   General:  Alert and oriented, No acute distress.    Eye:  Normal conjunctiva.    HENT:  Normocephalic.    Neck:  Supple, Non-tender.    Respiratory:  Lungs are clear to auscultation, Respirations are non-labored, Breath sounds are equal.    Cardiovascular:  Normal rate, Regular rhythm.    Integumentary:  Warm, Dry, Pink, No rash.    Neurologic:  Alert, Oriented, Normal motor function, No focal deficits.    Psychiatric:  Cooperative, Appropriate mood & affect, Normal judgment.       Review / Management   Results review

## 2022-02-16 NOTE — NURSING NOTE
Comprehensive Intake Entered On:  5/9/2019 4:39 PM CDT    Performed On:  5/9/2019 4:31 PM CDT by Sadie Villareal MA               Summary   Chief Complaint :   pt here for med check   Weight Measured :   147.6 lb(Converted to: 147 lb 10 oz, 66.95 kg)    Height Measured :   67.75 in(Converted to: 5 ft 8 in, 172.08 cm)    Body Mass Index :   22.61 kg/m2   Body Surface Area :   1.79 m2   Systolic Blood Pressure :   124 mmHg   Diastolic Blood Pressure :   78 mmHg   Mean Arterial Pressure :   93 mmHg   Peripheral Pulse Rate :   64 bpm   BP Site :   Right arm   Pulse Site :   Radial artery   BP Method :   Manual   HR Method :   Manual   Temperature Tympanic :   98.8 DegF(Converted to: 37.1 DegC)    Sadie Villareal MA - 5/9/2019 4:31 PM CDT   Health Status   Allergies Verified? :   Yes   Medication History Verified? :   Yes   Medical History Verified? :   Yes   Pre-Visit Planning Status :   Completed   Tobacco Use? :   Never smoker   Sadie Villareal MA - 5/9/2019 4:31 PM CDT   Consents   Consent for Immunization Exchange :   Consent Granted   Consent for Immunizations to Providers :   Consent Granted   Sadie Villareal MA - 5/9/2019 4:31 PM CDT   Problems   (As Of: 5/9/2019 4:39:52 PM CDT)   Problems(Active)    ADD (Attention Deficit Disorder) (SNOMED CT  :36594342 )  Name of Problem:   ADD (Attention Deficit Disorder) ; Recorder:   Douglas Geller MD; Confirmation:   Confirmed ; Classification:   Medical ; Code:   [id number] ; Contributor System:   [employer] ; Last Updated:   10/19/2015 4:09 PM CDT ; Life Cycle Date:   2/24/2011 ; Life Cycle Status:   Active ; Responsible Provider:   Douglas Geller MD; Vocabulary:   SNOMED CT          Meds / Allergies   (As Of: 5/9/2019 4:39:52 PM CDT)   Allergies (Active)   Pineapple  Estimated Onset Date:   Unspecified ; Reactions:   throat/tongue swelling, tingling ; Created By:   Julienne Gallardo CMA; Reaction Status:   Active ; Category:   Food ; Substance:   Pineapple ; Type:    Allergy ; Updated By:   Julienne Gallardo CMA; Source:   Patient ; Reviewed Date:   5/9/2019 4:34 PM CDT        Medication List   (As Of: 5/9/2019 4:39:52 PM CDT)   Prescription/Discharge Order    amphetamine-dextroamphetamine  :   amphetamine-dextroamphetamine ; Status:   Prescribed ; Ordered As Mnemonic:   Adderall 12.5 mg oral tablet ; Simple Display Line:   25 mg, 2 tab(s), PO, BID, 120 tab(s), 0 Refill(s) ; Ordering Provider:   Douglas Geller MD; Catalog Code:   amphetamine-dextroamphetamine ; Order Dt/Tm:   4/10/2019 12:13:32 PM          amphetamine-dextroamphetamine  :   amphetamine-dextroamphetamine ; Status:   Prescribed ; Ordered As Mnemonic:   Adderall 12.5 mg oral tablet ; Simple Display Line:   25 mg, 2 tab(s), PO, BID, fill on or after 11/8/2018, 120 tab(s), 0 Refill(s) ; Ordering Provider:   Douglas Mathews PA-C; Catalog Code:   amphetamine-dextroamphetamine ; Order Dt/Tm:   11/8/2018 4:49:11 PM          amphetamine-dextroamphetamine  :   amphetamine-dextroamphetamine ; Status:   Prescribed ; Ordered As Mnemonic:   Adderall 12.5 mg oral tablet ; Simple Display Line:   25 mg, 2 tab(s), PO, BID, 120 tab(s), 0 Refill(s) ; Ordering Provider:   Douglas Geller MD; Catalog Code:   amphetamine-dextroamphetamine ; Order Dt/Tm:   10/10/2018 4:57:08 PM            Home Meds    cholecalciferol  :   cholecalciferol ; Status:   Documented ; Ordered As Mnemonic:   Vitamin D3 400 intl units oral capsule ; Simple Display Line:   400 International Unit, 1 cap(s), Oral, daily, 0 Refill(s) ; Catalog Code:   cholecalciferol ; Order Dt/Tm:   5/9/2019 4:35:01 PM          biotin  :   biotin ; Status:   Documented ; Ordered As Mnemonic:   biotin ; Simple Display Line:   Oral, daily, 0 Refill(s) ; Catalog Code:   biotin ; Order Dt/Tm:   2/11/2019 9:04:33 AM          melatonin  :   melatonin ; Status:   Documented ; Ordered As Mnemonic:   Melatonin ; Simple Display Line:   hs, 0 Refill(s) ; Catalog Code:   melatonin ; Order  Dt/Tm:   4/13/2017 8:58:26 AM

## 2022-02-16 NOTE — PROGRESS NOTES
Patient:   SHARON DOMINGUEZ            MRN: [id number]            FIN: [id number]               Age:   46 years     Sex:  Female     :  1970   Associated Diagnoses:   Screen for STD (sexually transmitted disease)   Author:   Douglas Geller MD      Impression and Plan   Diagnosis     Screen for STD (sexually transmitted disease) (QEL53-UA Z11.3).     Orders     Orders   Charges (Evaluation and Management):  37542 office outpatient visit 15 minutes (Charge) (Order): Quantity: 1, Screen for STD (sexually transmitted disease).     Orders (Selected)   Outpatient Orders  Ordered (Collected)  Chlamydia/Neisseria gonorrhoeae RNA, TMA* ([facility]): Specimen Type: Urine, Collection Date: 17 16:55:00 CDT  Order  [place](R) Trichomonas vaginalis RNA, Ql, TMA* ([facility]): Specimen Type: Swab, Collection Date: 2017 4:58 PM CDT.        Visit Information      Date of Service: 2017 04:10 pm  Performing Location: [facility]  Encounter#: [id number]      Primary Care Provider (PCP):  Douglas Geller MD    NPI# [id number]      Referring Provider:  Douglas Geller MD, NPI# [id number]   Visit type:  New symptom.    Accompanied by:  No one.    Source of history:  Self.    History limitation:  None.       Chief Complaint   Chief complaint discussed and confirmed correct.     2017 4:14 PM CDT    Pt here for STD ck        History of Present Illness             The patient presents with Patient believes she may have been exposed to a STD.  She is asymptomatic but would like to run a screening test for Trichomonas in particular..        Review of Systems   Constitutional:  Negative.    Eye:  Negative.    Ear/Nose/Mouth/Throat:  Negative.    Respiratory:  Negative.    Cardiovascular:  Negative.    Gastrointestinal:  Negative.    Genitourinary:  Negative.    Hematology/Lymphatics:  Negative.    Endocrine:  Negative.    Immunologic:  Negative.    Musculoskeletal:  Negative.    Integumentary:  Negative.     Neurologic:  Negative.    Psychiatric:  Negative.          All other systems reviewed and negative      Health Status   Allergies:    Allergic Reactions (Selected)  Severity Not Documented  Pineapple (Throat/tongue swelling, tingling)   Medications:  (Selected)   Prescriptions  Prescribed  Adderall XR 25 mg oral capsule, extended release: 2 cap(s) ( 50 mg ), PO, qAM, # 60 cap(s), 0 Refill(s), Type: Maintenance, Pharmacy: [facility], 2 cap(s) po qam  Documented Medications  Documented  Fish Oil oral capsule: po, daily, 0 Refill(s), Type: Maintenance  Iron Chews: See Instructions, Instructions: 18mg po every other day, 0 Refill(s), Type: Maintenance  Melatonin: hs, 0 Refill(s), Type: Maintenance  Ranjit's wort oral tablet: 0 Refill(s), Type: Maintenance  Vitamin D3 2000 intl units oral tablet: 1 tab(s) ( 2,000 International Unit ), po, daily, 0 Refill(s), Type: Maintenance  barkberry: barkberry, See Instructions, Supply, 0 Refill(s), Type: Maintenance  magnesium oxide: ( 400 mg ), po, daily, 0 Refill(s), Type: Maintenance  multivitamin with minerals (w/ Iron): 0 Refill(s)   Problem list:    All Problems  ADD (Attention Deficit Disorder) / SNOMED CT 92494859 / Confirmed      Histories   Past Medical History:    No active or resolved past medical history items have been selected or recorded.   Family History:    Hypertension  Mother     Procedure history:    Dilation and curettage (SNOMED CT 21266571) in the month of 11/2007 at 36 Years.  cervical conization in the month of 9/2005 at 34 Years.  Tubal ligation (SNOMED CT 403237658) in the month of 9/2002 at 31 Years.  Comments:  5/11/2010 3:23 PM - Tapia , Sheryl  rt ooophorectomy due to benign teratoma  Tonsillectomy (SNOMED CT 079634759) in 1993 at 23 Years.  Hysterectomy (SNOMED CT 083371090).  Comments:  5/11/2010 3:24 PM - Tapia , Sheryl  supracervical due to fibroids   Social History:        Alcohol Assessment: Denies Alcohol Use      Tobacco Assessment:  Denies Tobacco Use        Physical Examination   Vital signs reviewed  and within acceptable limits    Vital Signs   8/14/2017 4:14 PM CDT    Temperature Tympanic      97.1 DegF  LOW     Measurements from flowsheet : Measurements   8/14/2017 4:14 PM CDT Height Measured - Standard 67.75 in    Weight Measured - Standard 145 lb    BSA 1.77 m2    Body Mass Index 22.21 kg/m2      General:  No acute distress.    Neck:  Supple, No lymphadenopathy, No thyromegaly.    Respiratory:  Lungs are clear to auscultation, Respirations are non-labored, Breath sounds are equal, Symmetrical chest wall expansion.    Cardiovascular:  Normal rate, Regular rhythm.    Integumentary:  Warm, Dry, Pink.    Neurologic:  Alert, Oriented.    Psychiatric:  Cooperative, Appropriate mood & affect.

## 2022-02-16 NOTE — PROGRESS NOTES
Patient:   SHARON DOMINGUEZ            MRN: [id number]            FIN: [id number]               Age:   47 years     Sex:  Female     :  1970   Associated Diagnoses:   Adult ADHD   Author:   Douglas Mathews PA-C      Chief Complaint   2018 4:38 PM CST    Pt here for med ck/refill      History of Present Illness             The patient presents with Presents for follow up of attention deficit disorder.  There have been no problems with the medication.  There are no other current concerns..     she is on 25 mg Adderall bid and that is working well for her     had labs done at work, she will bring them in for our records      Review of Systems   Constitutional:  Negative.    Cardiovascular:  Negative.    Gastrointestinal:  Negative.    Musculoskeletal:  Negative.    Neurologic:  Negative.    Psychiatric:  No anxiety, No depression.       Health Status   Allergies:    Allergic Reactions (Selected)  Severity Not Documented  Pineapple (Throat/tongue swelling, tingling)   Problem list:    All Problems  ADD (Attention Deficit Disorder) / SNOMED CT 57062232 / Confirmed   Medications:  (Selected)   Prescriptions  Prescribed  Adderall 12.5 mg oral tablet: = 2 tab(s) ( 25 mg ), PO, BID, # 120 tab(s), 0 Refill(s), Type: Maintenance, Pharmacy: [facility], 2 tab(s) Oral bid  Documented Medications  Documented  Melatonin: hs, 0 Refill(s), Type: Maintenance      Histories   Past Medical History:    No active or resolved past medical history items have been selected or recorded.   Family History:    Hypertension  Mother     Procedure history:    Dilation and curettage (65430791) in the month of 2007 at 36 Years.  cervical conization in the month of 2005 at 34 Years.  Tubal ligation (599534023) in the month of 2002 at 31 Years.  Comments:  2010 3:23 PM - Sheryl Tapia  rt ooophorectomy due to benign teratoma  Tonsillectomy (868245898) in  at 23 Years.  Hysterectomy  (609261813).  Comments:  5/11/2010 3:24 PM - Tapia , Sheryl  supracervical due to fibroids   Social History:        Alcohol Assessment: Denies Alcohol Use      Tobacco Assessment: Denies Tobacco Use        Physical Examination   Vital Signs   11/8/2018 4:38 PM CST Peripheral Pulse Rate 95 bpm    Systolic Blood Pressure 122 mmHg    Diastolic Blood Pressure 76 mmHg    Mean Arterial Pressure 91 mmHg    BP Site Right arm    Oxygen Saturation 98 %      Measurements from flowsheet : Measurements   11/8/2018 4:38 PM CST Height Measured - Standard 67.75 in    Weight Measured - Standard 141.8 lb    BSA 1.75 m2    Body Mass Index 21.72 kg/m2      General:  No acute distress.    HENT:  Tympanic membranes are clear, No pharyngeal erythema, No sinus tenderness.    Neck:  Supple, Non-tender, No lymphadenopathy.    Respiratory:  Lungs are clear to auscultation.    Cardiovascular:  Normal rate, Regular rhythm, No murmur.    Psychiatric:  Cooperative, Appropriate mood & affect.       Impression and Plan   Diagnosis     Adult ADHD (GLN66-KN F90.0).     Course:  Progressing as expected, Well controlled.    Plan:  continue current medication, given Rx for 2 months, will call for the 3rd month,  will be seen every 90 days, [place] PDMP consulted, no irregularities noted  .    Orders     Orders   Pharmacy:  Adderall 12.5 mg oral tablet (Prescribe): = 2 tab(s) ( 25 mg ), PO, BID, Instructions: fill on or after 12/4/2018, # 120 tab(s), 0 Refill(s), Type: Maintenance, Pharmacy: [facility], 2 tab(s) Oral bid,Instr:fill on or after 12/4/2018  Adderall 12.5 mg oral tablet (Prescribe): = 2 tab(s) ( 25 mg ), PO, BID, Instructions: fill on or after 11/8/2018, # 120 tab(s), 0 Refill(s), Type: Maintenance, Pharmacy: [facility], 2 tab(s) Oral bid,Instr:fill on or after 11/8/2018.     Orders   Charges (Evaluation and Management):  00160 office outpatient visit 15 minutes (Charge) (Order): Quantity: 1, Adult ADHD.     Summary

## 2022-02-16 NOTE — PROGRESS NOTES
Patient:   SHARON DOMINGUEZ            MRN: [id number]            FIN: [id number]               Age:   46 years     Sex:  Female     :  1970   Associated Diagnoses:   ADD (Attention Deficit Disorder)   Author:   Thom Garcia PA-C      Visit Information      Date of Service: 2017 10:00 am  Performing Location: [facility]  Encounter#: [id number]      Primary Care Provider (PCP):  Douglas Geller MD    NPI# [id number]      Referring Provider:  Thom Garcia PA-C    NPI# [id number]   Visit type:  Scheduled follow-up.    Accompanied by:  No one.    Source of history:  Self.    Referral source:  Self.    History limitation:  None.       Chief Complaint   Chief complaint discussed and confirmed correct.     2017 10:06 AM CDT   Pt. here for Med Ck        History of Present Illness             The patient presents Attention Deficit Discorder.  Exacerbating factors consist of need to concentrate at work place.  Relieving factors consist of medication.  Associated symptoms consist of none.  Additional pertinent history: none.     Medication working very well with no side effects - patient wants to continue treatment.      Review of Systems   Constitutional:  Negative.    Cardiovascular:  Negative.    Gastrointestinal:  Negative.    Psychiatric:  Negative except as documented in history of present illness.       Health Status   Allergies:    Allergic Reactions (Selected)  Severity Not Documented  Pineapple (Throat/tongue swelling, tingling)   Problem list:    All Problems  ADD (Attention Deficit Disorder) / SNOMED CT 20734062 / Confirmed   Medications:  (Selected)   Prescriptions  Prescribed  Adderall XR 25 mg oral capsule, extended release: 2 cap(s) ( 50 mg ), PO, qAM, # 60 cap(s), 0 Refill(s), Type: Maintenance, Pharmacy: [facility], 2 cap(s) po qam  Documented Medications  Documented  Fish Oil oral capsule: po, daily, 0 Refill(s), Type: Maintenance  Iron Chews: See Instructions,  Instructions: 18mg po every other day, 0 Refill(s), Type: Maintenance  Melatonin: hs, 0 Refill(s), Type: Maintenance  Ranjit's wort oral tablet: 0 Refill(s), Type: Maintenance  Vitamin D3 2000 intl units oral tablet: 1 tab(s) ( 2,000 International Unit ), po, daily, 0 Refill(s), Type: Maintenance  barkberry: barkberry, See Instructions, Supply, 0 Refill(s), Type: Maintenance  magnesium oxide: ( 400 mg ), po, daily, 0 Refill(s), Type: Maintenance  multivitamin with minerals (w/ Iron): 0 Refill(s)      Histories   Past Medical History:    No active or resolved past medical history items have been selected or recorded.   Family History:    Hypertension  Mother     Procedure history:    Dilation and curettage (92219953) in the month of 11/2007 at 36 Years.  cervical conization in the month of 9/2005 at 34 Years.  Tubal ligation (990873349) in the month of 9/2002 at 31 Years.  Comments:  5/11/2010 3:23 PM - Sheryl Tapia  rt ooophorectomy due to benign teratoma  Tonsillectomy (139882258) in 1993 at 23 Years.  Hysterectomy (234025289).  Comments:  5/11/2010 3:24 PM - Sheryl Tapia  supracervical due to fibroids   Social History:        Alcohol Assessment: Denies Alcohol Use      Tobacco Assessment: Denies Tobacco Use        Physical Examination   Vital Signs   7/13/2017 10:06 AM CDT Peripheral Pulse Rate 72 bpm    Pulse Site Radial artery    HR Method Manual    Systolic Blood Pressure 120 mmHg    Diastolic Blood Pressure 62 mmHg    Mean Arterial Pressure 81 mmHg    BP Site Right arm    BP Method Manual      Measurements from flowsheet : Measurements   7/13/2017 10:06 AM CDT Height Measured - Standard 67.75 in    Weight Measured - Standard 145.0 lb    BSA 1.77 m2    Body Mass Index 22.21 kg/m2      General:  No acute distress.    Neck:  Supple, No lymphadenopathy.    Respiratory:  Lungs are clear to auscultation, Respirations are non-labored, Breath sounds are equal, Symmetrical chest wall expansion.    Cardiovascular:   Normal rate, Regular rhythm, No murmur.    Integumentary:  Warm, Dry, Pink.    Neurologic:  Alert, Oriented, No neurologic tics noted on examination.    Psychiatric:  Cooperative, Appropriate mood & affect.       Impression and Plan   Diagnosis     ADD (Attention Deficit Disorder) (SHW52-IY F90.0).     Patient Instructions:       Counseled: Patient, Regarding diagnosis, Regarding treatment, Regarding medications, Activity, Verbalized understanding.    Orders     Orders (Selected)   Prescriptions  Prescribed  Adderall XR 25 mg oral capsule, extended release: 2 cap(s) ( 50 mg ), PO, qAM, # 60 cap(s), 0 Refill(s), Type: Maintenance, Pharmacy: [facility], 2 cap(s) po qam.     CSA updated. Per Dr. Geller's note RTC in 90 days and PRN.

## 2022-02-16 NOTE — PROGRESS NOTES
Patient:   SHARON DOMINGUEZ            MRN: [id number]            FIN: [id number]               Age:   47 years     Sex:  Female     :  1970   Associated Diagnoses:   ADD (Attention Deficit Disorder)   Author:   Douglas Geller MD      Impression and Plan   Diagnosis     ADD (Attention Deficit Disorder) (HBR55-WV F90.0).     Course:  Progressing as expected.    Orders     Orders   Charges (Evaluation and Management):  61296 office outpatient visit 15 minutes (Charge) (Order): Quantity: 1, ADD (Attention Deficit Disorder).     Orders (Selected)   Prescriptions  Prescribed  Adderall 12.5 mg oral tablet: 2 tab(s) ( 25 mg ), PO, BID, Instructions: Earliest Fill Date 10/15/2018, # 120 tab(s), 0 Refill(s), Type: Maintenance.        Visit Information      Date of Service: 08/15/2018 04:20 pm  Performing Location: [facility]  Encounter#: [id number]      Primary Care Provider (PCP):  Douglas Geller MD    NPI# [id number]      Referring Provider:  Douglas Geller MD, NPI# [id number]   Visit type:  Scheduled follow-up.    Accompanied by:  No one.    Source of history:  Self.    Referral source:  Self.    History limitation:  None.       Chief Complaint   8/15/2018 4:23 PM CDT    Pt here for med ck        History of Present Illness             The patient presents with Patient used to be on Adderall 50 mg daily but was recently switched to 40 mg daily.  She would like to return to the 50 mg dose because it is not strong enough at the current dose.   Otherwise no problems..                Review of Systems   Constitutional:  Negative.    Eye:  Negative.    Ear/Nose/Mouth/Throat:  Negative.    Respiratory:  Negative.    Cardiovascular:  Negative.    Gastrointestinal:  Negative.    Genitourinary:  Negative.    Hematology/Lymphatics:  Negative.    Endocrine:  Negative.    Immunologic:  Negative.    Musculoskeletal:  Negative.    Integumentary:  Negative.    Neurologic:  Negative.    Psychiatric:  Negative,  PHQ-9=  0, No anxiety, No depression.           All other systems reviewed and negative      Health Status   Allergies:    Allergic Reactions (Selected)  Severity Not Documented  Pineapple (Throat/tongue swelling, tingling)   Problem list:    All Problems  ADD (Attention Deficit Disorder) / SNOMED CT 84852403 / Confirmed   Medications:  (Selected)   Prescriptions  Prescribed  Adderall 12.5 mg oral tablet: 2 tab(s) ( 25 mg ), PO, BID, Instructions: Earliest Fill Date 10/15/2018, # 120 tab(s), 0 Refill(s), Type: Maintenance  Documented Medications  Documented  Melatonin: hs, 0 Refill(s), Type: Maintenance      Histories   Past Medical History:    No active or resolved past medical history items have been selected or recorded.   Family History:    Hypertension  Mother     Procedure history:    Dilation and curettage (SNOMED CT 10570504) in the month of 11/2007 at 36 Years.  cervical conization in the month of 9/2005 at 34 Years.  Tubal ligation (SNOMED CT 923015988) in the month of 9/2002 at 31 Years.  Comments:  5/11/2010 3:23 PM - Sheryl Tapia  rt ooophorectomy due to benign teratoma  Tonsillectomy (SNOMED CT 322424390) in 1993 at 23 Years.  Hysterectomy (SNOMED CT 824815288).  Comments:  5/11/2010 3:24 PM - Sheryl Tapia  supracervical due to fibroids   Social History:        Alcohol Assessment: Denies Alcohol Use      Tobacco Assessment: Denies Tobacco Use        Physical Examination   Vital Signs   8/15/2018 4:23 PM CDT Peripheral Pulse Rate 91 bpm    Systolic Blood Pressure 120 mmHg    Diastolic Blood Pressure 68 mmHg    Mean Arterial Pressure 85 mmHg    BP Site Right arm    Oxygen Saturation 99 %      Measurements from flowsheet : Measurements   8/15/2018 4:23 PM CDT Height Measured - Standard 67.75 in    Weight Measured - Standard 141.4 lb    BSA 1.75 m2    Body Mass Index 21.66 kg/m2      General:  No acute distress.    HENT:  Tympanic membranes are clear, No pharyngeal erythema, No sinus tenderness.    Neck:   Supple, Non-tender, No lymphadenopathy.    Respiratory:  Lungs are clear to auscultation.    Cardiovascular:  Normal rate, Regular rhythm, No murmur.    Psychiatric:  Cooperative, Appropriate mood & affect.       Health Maintenance      Recommendations     Pending (in the next year)        OverDue           Breast Cancer Screen due  10/17/17  and every 1  year(s)        Near Due            Alcohol Misuse Screen (Female) near due  10/12/18  and every 1  year(s)           Depression Screen (Female) near due  10/12/18  and every 1  year(s)        Refused            Influenza Vaccine due  08/15/18  and every 1  year(s)     Satisfied (in the past 1 year)        Satisfied            Alcohol Misuse Screen (Female) on  10/12/17.           Body Mass Index Check (Female) on  08/15/18.           Body Mass Index Check (Female) on  05/10/18.           Body Mass Index Check (Female) on  01/11/18.           Body Mass Index Check (Female) on  10/31/17.           Body Mass Index Check (Female) on  10/12/17.           Depression Screen (Female) on  10/12/17.           Depression Screen (Female) on  10/12/17.           Depression Screen (Female) on  10/12/17.           High Blood Pressure Screen (Female) on  08/15/18.           High Blood Pressure Screen (Female) on  05/10/18.           High Blood Pressure Screen (Female) on  01/11/18.           High Blood Pressure Screen (Female) on  10/12/17.           Tobacco Use Screen (Female) on  08/15/18.           Tobacco Use Screen (Female) on  05/10/18.           Tobacco Use Screen (Female) on  01/11/18.           Tobacco Use Screen (Female) on  10/31/17.           Tobacco Use Screen (Female) on  10/12/17.

## 2022-02-16 NOTE — PROGRESS NOTES
Patient:   SHARON DOMINGUEZ            MRN: [id number]            FIN: [id number]               Age:   46 years     Sex:  Female     :  1970   Associated Diagnoses:   ADD (Attention Deficit Disorder)   Author:   Douglas Geller MD      Impression and Plan   Diagnosis     ADD (Attention Deficit Disorder) (KNB75-VW F90.0).     Course:  Progressing as expected.    Orders     Orders   Charges (Evaluation and Management):  05020 office outpatient visit 15 minutes (Charge) (Order): Quantity: 1, ADD (Attention Deficit Disorder).     Orders (Selected)   Prescriptions  Prescribed  Adderall XR 25 mg oral capsule, extended release: 2 cap(s) ( 50 mg ), PO, qAM, # 60 cap(s), 0 Refill(s), Type: Maintenance, Pharmacy: [facility], 2 cap(s) po qam.        Visit Information      Date of Service: 2017 09:45 am  Performing Location: [facility]  Encounter#: [id number]      Primary Care Provider (PCP):  Douglas Geller MD    NPI# [id number]   Visit type:  Scheduled follow-up.    Accompanied by:  No one.    Source of history:  Self.    Referral source:  Self.    History limitation:  None.       Chief Complaint   Chief complaint discussed and confirmed correct.     2017 9:49 AM CST    Pt here for med ck        History of Present Illness             The patient presents Attention Deficit Discorder.  Exacerbating factors consist of need to concentrate at work place.  Relieving factors consist of medication.  Associated symptoms consist of none.  Additional pertinent history: none.     Medication working very well with no side effects - patient wants to continue treatment.      Review of Systems   Constitutional:  Negative.    Eye:  Negative.    Ear/Nose/Mouth/Throat:  Negative.    Respiratory:  Negative.    Cardiovascular:  Negative.    Gastrointestinal:  Negative.    Genitourinary:  Negative.    Hematology/Lymphatics:  Negative.    Endocrine:  Negative.    Immunologic:  Negative.     Musculoskeletal:  soft tissue injury right lateral hip area due to yoga.  offered PT.  Patient will call back if she wishes Rx..    Integumentary:  Negative.    Neurologic:  Negative.    Psychiatric:  Negative.    All other systems reviewed and negative      Health Status   Allergies:    Allergic Reactions (Selected)  Severity Not Documented  Pineapple (Throat/tongue swelling, tingling)   Problem list:    All Problems  ADD (Attention Deficit Disorder) / SNOMED CT 13163165 / Confirmed   Medications:  (Selected)   Prescriptions  Prescribed  Adderall XR 25 mg oral capsule, extended release: 2 cap(s) ( 50 mg ), PO, qAM, # 60 cap(s), 0 Refill(s), Type: Maintenance, Pharmacy: [facility], 2 cap(s) po qam  Documented Medications  Documented  Fish Oil oral capsule: po, daily, 0 Refill(s), Type: Maintenance  Iron Chews: See Instructions, Instructions: 18mg po every other day, 0 Refill(s), Type: Maintenance  Ranjit's wort oral tablet: 0 Refill(s), Type: Maintenance  Vitamin D3 2000 intl units oral tablet: 1 tab(s) ( 2,000 International Unit ), po, daily, 0 Refill(s), Type: Maintenance  magnesium oxide: ( 400 mg ), po, daily, 0 Refill(s), Type: Maintenance  multivitamin with minerals (w/ Iron): 0 Refill(s)      Histories   Past Medical History:    No active or resolved past medical history items have been selected or recorded.   Family History:    Hypertension  Mother     Procedure history:    Dilation and curettage (SNOMED CT 73030080) in the month of 11/2007 at 36 Years.  cervical conization in the month of 9/2005 at 34 Years.  Tubal ligation (SNOMED CT 460368764) in the month of 9/2002 at 31 Years.  Comments:  5/11/2010 3:23 PM - Tapia , Sheryl  rt ooophorectomy due to benign teratoma  Tonsillectomy (SNOMED CT 706381800) in 1993 at 23 Years.  Hysterectomy (SNOMED CT 083238454).  Comments:  5/11/2010 3:24 PM - Tapia , Sheryl  supracervical due to fibroids   Social History:        Alcohol Assessment: Denies Alcohol  Use      Tobacco Assessment: Denies Tobacco Use        Physical Examination   Vital Signs   1/16/2017 9:49 AM CST Peripheral Pulse Rate 76 bpm    Pulse Site Radial artery    HR Method Manual    Systolic Blood Pressure 128 mmHg    Diastolic Blood Pressure 72 mmHg    Mean Arterial Pressure 91 mmHg    BP Site Right arm    BP Method Manual      Measurements from flowsheet : Measurements   1/16/2017 9:49 AM CST Height Measured - Standard 67.75 in    Weight Measured - Standard 154.4 lb    BSA 1.83 m2    Body Mass Index 23.65 kg/m2      General:  No acute distress.    Neck:  Supple, No lymphadenopathy, No thyromegaly.    Respiratory:  Lungs are clear to auscultation, Respirations are non-labored, Breath sounds are equal, Symmetrical chest wall expansion.    Cardiovascular:  Normal rate, Regular rhythm, No murmur, No gallop, Good pulses equal in all extremities, Normal peripheral perfusion, No edema.    Gastrointestinal:  Soft, Non-tender, Non-distended, Normal bowel sounds, No organomegaly.    Integumentary:  Warm, Dry, Pink.    Neurologic:  Alert, Oriented, No neurologic tics noted on examination.    Psychiatric:  Cooperative, Appropriate mood & affect.

## 2022-02-16 NOTE — PROGRESS NOTES
Patient:   SHARON DOMINGUEZ            MRN: [id number]            FIN: [id number]               Age:   46 years     Sex:  Female     :  1970   Associated Diagnoses:   Cystitis   Author:   Douglas Geller MD      Impression and Plan   Diagnosis     Cystitis (HQI11-OH N30.90).     Course:  Worsening.    Orders     Orders   Charges (Evaluation and Management):  96949 office outpatient visit 15 minutes (Charge) (Order): Quantity: 1, Cystitis.     Orders (Selected)   Prescriptions  Prescribed  Macrobid 100 mg oral capsule: 1 cap(s) ( 100 mg ), PO, BID, # 20 cap(s), 1 Refill(s), Type: Maintenance.        Visit Information      Date of Service: 10/31/2017 03:18 pm  Performing Location: [facility]  Encounter#: [id number]      Primary Care Provider (PCP):  Douglas Geller MD    NPI# [id number]   Visit type:  New symptom.    Accompanied by:  No one.    Source of history:  Self.    History limitation:  None.       Chief Complaint   Chief complaint discussed and confirmed correct.     10/31/2017 3:21 PM CDT   Pt here with urine urgency and buring with bladder pressure        History of Present Illness             The patient presents with dysuria.  The dysuria is characterized by burning.  The severity of the dysuria is severe.  The dysuria is constant.  The dysuria has lasted for 1 day(s).  There are no modifying factors.  Associated symptoms consist of none.        Review of Systems   Constitutional:  Negative.    Eye:  Negative.    Ear/Nose/Mouth/Throat:  Negative.    Respiratory:  Negative.    Cardiovascular:  Negative.    Gastrointestinal:  Negative.    Genitourinary:  Dysuria.    Hematology/Lymphatics:  Negative.    Endocrine:  Negative.    Immunologic:  Negative.    Musculoskeletal:  Negative.    Integumentary:  Negative.    Neurologic:  Negative.    Psychiatric:  Negative.          All other systems reviewed and negative      Health Status   Allergies:    Allergic Reactions (Selected)  Severity  Not Documented  Pineapple (Throat/tongue swelling, tingling)   Medications:  (Selected)   Prescriptions  Prescribed  Adderall XR 25 mg oral capsule, extended release: 2 cap(s) ( 50 mg ), PO, qAM, # 60 cap(s), 0 Refill(s), Type: Maintenance  Macrobid 100 mg oral capsule: 1 cap(s) ( 100 mg ), PO, BID, # 20 cap(s), 1 Refill(s), Type: Maintenance  Documented Medications  Documented  Fish Oil oral capsule: po, daily, 0 Refill(s), Type: Maintenance  Iron Chews: See Instructions, Instructions: 18mg po every other day, 0 Refill(s), Type: Maintenance  Melatonin: hs, 0 Refill(s), Type: Maintenance  Vitamin D3 2000 intl units oral tablet: 1 tab(s) ( 2,000 International Unit ), po, daily, 0 Refill(s), Type: Maintenance  barkberry: barkberry, See Instructions, Supply, 0 Refill(s), Type: Maintenance  coconut oil vitamin: coconut oil vitamin, Supply, 0 Refill(s), Type: Maintenance  multivitamin with minerals (w/ Iron): 0 Refill(s)   Problem list:    All Problems  ADD (Attention Deficit Disorder) / SNOMED CT 56937367 / Confirmed      Histories   Past Medical History:    No active or resolved past medical history items have been selected or recorded.   Family History:    Hypertension  Mother     Procedure history:    Dilation and curettage (SNOMED CT 15121548) in the month of 11/2007 at 36 Years.  cervical conization in the month of 9/2005 at 34 Years.  Tubal ligation (SNOMED CT 377355697) in the month of 9/2002 at 31 Years.  Comments:  5/11/2010 3:23 PM - Tapia , Sheryl  rt ooophorectomy due to benign teratoma  Tonsillectomy (SNOMED CT 403063371) in 1993 at 23 Years.  Hysterectomy (SNOMED CT 796692252).  Comments:  5/11/2010 3:24 PM - Tapia , Sheryl  supracervical due to fibroids   Social History:        Alcohol Assessment: Denies Alcohol Use      Tobacco Assessment: Denies Tobacco Use        Physical Examination   Vital signs reviewed  and within acceptable limits    Vital Signs   10/31/2017 3:21 PM CDT   Temperature Tympanic       97.9 DegF     Measurements from flowsheet : Measurements   10/31/2017 3:21 PM CDT Height Measured - Standard 67.75 in    Weight Measured - Standard 141.6 lb    BSA 1.75 m2    Body Mass Index 21.69 kg/m2      General:  No acute distress.    Respiratory:  Lungs are clear to auscultation, Respirations are non-labored, Breath sounds are equal, Symmetrical chest wall expansion.    Cardiovascular:  Normal rate, Regular rhythm, No murmur, No gallop, Good pulses equal in all extremities, Normal peripheral perfusion, No edema.    Gastrointestinal:  Soft, Non-tender, Non-distended, Normal bowel sounds, No organomegaly.    Genitourinary:  No costovertebral angle tenderness.    Integumentary:  Warm, Dry, Pink.    Neurologic:  Alert, Oriented.    Psychiatric:  Cooperative, Appropriate mood & affect.       Review / Management   Results review:  UA: Positive LE.

## 2022-02-16 NOTE — PROGRESS NOTES
Patient:   SHARON DOMINGUEZ            MRN: [id number]            FIN: [id number]               Age:   47 years     Sex:  Female     :  1970   Associated Diagnoses:   ADD (Attention Deficit Disorder)   Author:   Douglas Geller MD      Impression and Plan   Diagnosis     ADD (Attention Deficit Disorder) ([id number] F90.0).     Course:  Progressing as expected.    Plan:  follow up 3 months.    Orders     Orders   Charges (Evaluation and Management):  [id number] office outpatient visit 15 minutes (Charge) (Order): Quantity: 1, ADD (Attention Deficit Disorder).     Orders (Selected)   Prescriptions  Prescribed  Adderall XR 25 mg oral capsule, extended release: 2 cap(s) ( 50 mg ), PO, qAM, # 60 cap(s), 0 Refill(s), Type: Maintenance.     Three Rx given.        Visit Information      Date of Service: 2018 08:18 am  Performing Location: [facility]  Encounter#: [id number]      Primary Care Provider (PCP):  Douglas Geller MD    NPI# [id number]      Referring Provider:  Douglas Geller MD# [id number]   Visit type:  Scheduled follow-up.    Accompanied by:  No one.    Source of history:  Self.    Referral source:  Self.    History limitation:  None.       Chief Complaint   2018 8:22 AM CST    Pt here for med ck        History of Present Illness             The patient presents with Presents for follow up of attention deficit disorder.  There have been no problems with the medication.  There are no other current concerns..                Review of Systems   Constitutional:  Negative.    Eye:  Negative.    Ear/Nose/Mouth/Throat:  Negative.    Respiratory:  Negative.    Cardiovascular:  Negative.    Gastrointestinal:  Negative.    Genitourinary:  Negative.    Hematology/Lymphatics:  Negative.    Endocrine:  Negative.    Immunologic:  Negative.    Musculoskeletal:  Negative.    Integumentary:  Negative.    Neurologic:  Negative.    Psychiatric:  Negative, PHQ-9=  0, No anxiety, No depression.           All other systems reviewed and negative      Health Status   Allergies:    Allergic Reactions (Selected)  Severity Not Documented  Pineapple (Throat/tongue swelling, tingling)   Problem list:    All Problems  ADD (Attention Deficit Disorder) / SNOMED CT 32996178 / Confirmed   Medications:  (Selected)   Prescriptions  Prescribed  Adderall XR 25 mg oral capsule, extended release: 2 cap(s) ( 50 mg ), PO, qAM, # 60 cap(s), 0 Refill(s), Type: Maintenance  Documented Medications  Documented  Fish Oil oral capsule: po, daily, 0 Refill(s), Type: Maintenance  Iron Chews: See Instructions, Instructions: 18mg po every other day, 0 Refill(s), Type: Maintenance  Melatonin: hs, 0 Refill(s), Type: Maintenance  Vitamin D3 2000 intl units oral tablet: 1 tab(s) ( 2,000 International Unit ), po, daily, 0 Refill(s), Type: Maintenance  barkberry: barkberry, See Instructions, Supply, 0 Refill(s), Type: Maintenance  coconut oil vitamin: coconut oil vitamin, Supply, 0 Refill(s), Type: Maintenance  multivitamin with minerals (w/ Iron): 0 Refill(s)      Histories   Past Medical History:    No active or resolved past medical history items have been selected or recorded.   Family History:    Hypertension  Mother     Procedure history:    Dilation and curettage (SNOMED CT 76526563) in the month of 11/2007 at 36 Years.  cervical conization in the month of 9/2005 at 34 Years.  Tubal ligation (SNOMED CT 929713482) in the month of 9/2002 at 31 Years.  Comments:  5/11/2010 3:23 PM - Tapia Sheryl  rt ooophorectomy due to benign teratoma  Tonsillectomy (SNOMED CT 237174063) in 1993 at 23 Years.  Hysterectomy (SNOMED CT 263533061).  Comments:  5/11/2010 3:24 PM - Sheryl Tapia  supracervical due to fibroids   Social History:        Alcohol Assessment: Denies Alcohol Use      Tobacco Assessment: Denies Tobacco Use        Physical Examination   Vital Signs   1/11/2018 8:22 AM CST Peripheral Pulse Rate 92 bpm    Systolic Blood Pressure 102 mmHg     Diastolic Blood Pressure 68 mmHg    Mean Arterial Pressure 79 mmHg    BP Site Right arm    Oxygen Saturation 93 %  LOW      Measurements from flowsheet : Measurements   1/11/2018 8:22 AM CST Height Measured - Standard 67.75 in    Weight Measured - Standard 139.8 lb    BSA 1.74 m2    Body Mass Index 21.41 kg/m2      General:  No acute distress.    HENT:  Tympanic membranes are clear, No pharyngeal erythema, No sinus tenderness.    Neck:  Supple, Non-tender, No lymphadenopathy.    Respiratory:  Lungs are clear to auscultation.    Cardiovascular:  Normal rate, Regular rhythm, No murmur.    Psychiatric:  Cooperative, Appropriate mood & affect.       Health Maintenance

## 2022-02-16 NOTE — LETTER
(Inserted Image. Unable to display)       November 14, 2019      SHARON DOMINGUEZ  [address] [place], [place] [id number]          Dear SHARON,      Thank you for selecting [facility] for your healthcare needs.     Our records indicate you are due for the following services:     Medication Check    To schedule an appointment or if you have further questions, please contact your primary clinic:   [facility]       [phone]   [facility]       [phone]              [facility]     [phone]    Powered by [employer]    Sincerely,    Douglas Geller MD

## 2022-02-16 NOTE — NURSING NOTE
Comprehensive Intake Entered On:  2/11/2019 9:04 AM CST    Performed On:  2/11/2019 8:59 AM CST by Erin Deleon               Summary   Chief Complaint :   Pt here for med check/refill- Adderall   Weight Measured :   141.4 lb(Converted to: 141 lb 6 oz, 64.14 kg)    Height Measured :   67.75 in(Converted to: 5 ft 8 in, 172.08 cm)    Body Mass Index :   21.66 kg/m2   Body Surface Area :   1.75 m2   Systolic Blood Pressure :   118 mmHg   Diastolic Blood Pressure :   76 mmHg   Mean Arterial Pressure :   90 mmHg   Peripheral Pulse Rate :   88 bpm   BP Site :   Right arm   Pulse Site :   Radial artery   BP Method :   Manual   HR Method :   Manual   Temperature Tympanic :   97.4 DegF(Converted to: 36.3 DegC)  (LOW)    Erin Deleon - 2/11/2019 8:59 AM CST   Health Status   Allergies Verified? :   Yes   Medication History Verified? :   Yes   Medical History Verified? :   Yes   Pre-Visit Planning Status :   Completed   Tobacco Use? :   Never smoker   Erin Deleon - 2/11/2019 8:59 AM CST   Consents   Consent for Immunization Exchange :   Consent Granted   Consent for Immunizations to Providers :   Consent Granted   Erin Deleon - 2/11/2019 8:59 AM CST   Meds / Allergies   (As Of: 2/11/2019 9:04:18 AM CST)   Allergies (Active)   Pineapple  Estimated Onset Date:   Unspecified ; Reactions:   throat/tongue swelling, tingling ; Created By:   Julienne Gallardo LPN; Reaction Status:   Active ; Category:   Food ; Substance:   Pineapple ; Type:   Allergy ; Updated By:   Julienne Gallardo LPN; Source:   Patient ; Reviewed Date:   2/11/2019 9:03 AM CST        Medication List   (As Of: 2/11/2019 9:04:18 AM CST)   Prescription/Discharge Order    amphetamine-dextroamphetamine  :   amphetamine-dextroamphetamine ; Status:   Prescribed ; Ordered As Mnemonic:   Adderall 12.5 mg oral tablet ; Simple Display Line:   25 mg, 2 tab(s), PO, BID, 120 tab(s), 0 Refill(s) ; Ordering Provider:   Douglas Geller MD; Catalog Code:    amphetamine-dextroamphetamine ; Order Dt/Tm:   1/2/2019 10:11:02 AM          amphetamine-dextroamphetamine  :   amphetamine-dextroamphetamine ; Status:   Prescribed ; Ordered As Mnemonic:   Adderall 12.5 mg oral tablet ; Simple Display Line:   25 mg, 2 tab(s), PO, BID, fill on or after 11/8/2018, 120 tab(s), 0 Refill(s) ; Ordering Provider:   Reid SHEETS, Douglas RAWLS; Catalog Code:   amphetamine-dextroamphetamine ; Order Dt/Tm:   11/8/2018 4:49:11 PM          amphetamine-dextroamphetamine  :   amphetamine-dextroamphetamine ; Status:   Prescribed ; Ordered As Mnemonic:   Adderall 12.5 mg oral tablet ; Simple Display Line:   25 mg, 2 tab(s), PO, BID, 120 tab(s), 0 Refill(s) ; Ordering Provider:   Douglas Geller MD; Catalog Code:   amphetamine-dextroamphetamine ; Order Dt/Tm:   10/10/2018 4:57:08 PM            Home Meds    melatonin  :   melatonin ; Status:   Documented ; Ordered As Mnemonic:   Melatonin ; Simple Display Line:   hs, 0 Refill(s) ; Catalog Code:   melatonin ; Order Dt/Tm:   4/13/2017 8:58:26 AM

## 2022-06-16 NOTE — PROCEDURES
Accession Number:       [id number]  PATHOLOGIST:     Harish Hardy M.D., Board Certified in Anatomic Pathology and Clinical Pathology 6  (electronic signature)  A SOURCE:     Skin, right temple  A GROSS DESCRIPTION:     See comment       Specimen is received in formalin, labeled with       multiple patient identifier(s) and consists of       one piece of skin measuring 0.8 x 0.6 x 0.2 cm,       irregular in shape and tan-gray in color. The       margins are inked green. The specimen is serially       sectioned and entirely submitted in one       cassette(s).         Gross exam(s) performed at: [address] [address]         : TK LI MD  A DIAGNOSIS:     Benign seborrheic keratosis.